# Patient Record
Sex: FEMALE | Race: WHITE | NOT HISPANIC OR LATINO | URBAN - METROPOLITAN AREA
[De-identification: names, ages, dates, MRNs, and addresses within clinical notes are randomized per-mention and may not be internally consistent; named-entity substitution may affect disease eponyms.]

---

## 2019-01-08 ENCOUNTER — OFFICE (OUTPATIENT)
Dept: URBAN - METROPOLITAN AREA CLINIC 101 | Facility: CLINIC | Age: 59
End: 2019-01-08

## 2019-01-08 VITALS
SYSTOLIC BLOOD PRESSURE: 132 MMHG | HEART RATE: 65 BPM | DIASTOLIC BLOOD PRESSURE: 85 MMHG | WEIGHT: 211 LBS | HEIGHT: 61 IN | TEMPERATURE: 98.1 F

## 2019-01-08 DIAGNOSIS — R19.8 OTHER SPECIFIED SYMPTOMS AND SIGNS INVOLVING THE DIGESTIVE S: ICD-10-CM

## 2019-01-08 DIAGNOSIS — K59.09 OTHER CONSTIPATION: ICD-10-CM

## 2019-01-08 DIAGNOSIS — R13.19 OTHER DYSPHAGIA: ICD-10-CM

## 2019-01-08 DIAGNOSIS — Z80.0 FAMILY HISTORY OF MALIGNANT NEOPLASM OF DIGESTIVE ORGANS: ICD-10-CM

## 2019-01-08 PROCEDURE — 00031: CPT

## 2019-01-08 PROCEDURE — 99204 OFFICE O/P NEW MOD 45 MIN: CPT

## 2019-01-08 NOTE — SERVICEHPINOTES
JADEN VELASQUEZ   is a   58   year old    female who is being seen in consultation at the request of   JUAN ALBERTO GAMING   for dysphagia. She had back surgery in Sept and ever since had issues with dysphagia to solids and pills mainly in mid esophagus. Also reports globus sensation constantly. She has long h/o GERD, well controlled with Nexium daily. No breakthrough heartburn. She reports some nausea, usually in the mornings prior to taking any medications, rare emesis. She had an EGD but years ago.BRShe also reports she is due for a colonoscopy as of end of last year. Her last colonoscopy was around 2013? at [facility] with Dr Agee. She has been getting them every 5 years or so as her brother had colon cancer around age 44. She has never had polyps from what she can recall. She takes miralax daily which keeps her regular. No issues as long as she takes this. No rectal bleeding, abdominal pain, weight loss, fever, or chills.BR She thinks she has a fib? She does not take any anticoagulants aside from aspirin. Has never had a MI or CVA. Has CARLEE, has CPAP but does not use. She states she did not have any complications with anesthesia with spinal surgery in Sept.

## 2019-02-11 ENCOUNTER — ON CAMPUS - OUTPATIENT (OUTPATIENT)
Dept: URBAN - METROPOLITAN AREA HOSPITAL 35 | Facility: HOSPITAL | Age: 59
End: 2019-02-11
Payer: MEDICARE

## 2019-02-11 DIAGNOSIS — K29.60 OTHER GASTRITIS WITHOUT BLEEDING: ICD-10-CM

## 2019-02-11 DIAGNOSIS — K20.8 OTHER ESOPHAGITIS: ICD-10-CM

## 2019-02-11 DIAGNOSIS — R13.10 DYSPHAGIA, UNSPECIFIED: ICD-10-CM

## 2019-02-11 DIAGNOSIS — Z12.11 ENCOUNTER FOR SCREENING FOR MALIGNANT NEOPLASM OF COLON: ICD-10-CM

## 2019-02-11 PROCEDURE — 43239 EGD BIOPSY SINGLE/MULTIPLE: CPT

## 2019-02-11 PROCEDURE — G0121 COLON CA SCRN NOT HI RSK IND: HCPCS
